# Patient Record
Sex: MALE | Race: WHITE
[De-identification: names, ages, dates, MRNs, and addresses within clinical notes are randomized per-mention and may not be internally consistent; named-entity substitution may affect disease eponyms.]

---

## 2018-03-10 ENCOUNTER — HOSPITAL ENCOUNTER (EMERGENCY)
Dept: HOSPITAL 47 - EC | Age: 48
LOS: 1 days | Discharge: HOME | End: 2018-03-11
Payer: COMMERCIAL

## 2018-03-10 VITALS — RESPIRATION RATE: 18 BRPM

## 2018-03-10 DIAGNOSIS — Z79.899: ICD-10-CM

## 2018-03-10 DIAGNOSIS — R73.9: ICD-10-CM

## 2018-03-10 DIAGNOSIS — E78.5: ICD-10-CM

## 2018-03-10 DIAGNOSIS — R53.83: Primary | ICD-10-CM

## 2018-03-10 LAB
ALBUMIN SERPL-MCNC: 4.4 G/DL (ref 3.5–5)
ALP SERPL-CCNC: 69 U/L (ref 38–126)
ALT SERPL-CCNC: 69 U/L (ref 21–72)
ANION GAP SERPL CALC-SCNC: 11 MMOL/L
AST SERPL-CCNC: 37 U/L (ref 17–59)
BASOPHILS # BLD AUTO: 0.1 K/UL (ref 0–0.2)
BASOPHILS NFR BLD AUTO: 1 %
BUN SERPL-SCNC: 13 MG/DL (ref 9–20)
CALCIUM SPEC-MCNC: 9.5 MG/DL (ref 8.4–10.2)
CHLORIDE SERPL-SCNC: 107 MMOL/L (ref 98–107)
CO2 SERPL-SCNC: 27 MMOL/L (ref 22–30)
EOSINOPHIL # BLD AUTO: 0.3 K/UL (ref 0–0.7)
EOSINOPHIL NFR BLD AUTO: 6 %
ERYTHROCYTE [DISTWIDTH] IN BLOOD BY AUTOMATED COUNT: 5.15 M/UL (ref 4.3–5.9)
ERYTHROCYTE [DISTWIDTH] IN BLOOD: 13.1 % (ref 11.5–15.5)
GLUCOSE BLD-MCNC: 105 MG/DL (ref 75–99)
GLUCOSE SERPL-MCNC: 106 MG/DL (ref 74–99)
HCT VFR BLD AUTO: 43.9 % (ref 39–53)
HGB BLD-MCNC: 14.8 GM/DL (ref 13–17.5)
LYMPHOCYTES # SPEC AUTO: 1.7 K/UL (ref 1–4.8)
LYMPHOCYTES NFR SPEC AUTO: 30 %
MCH RBC QN AUTO: 28.6 PG (ref 25–35)
MCHC RBC AUTO-ENTMCNC: 33.6 G/DL (ref 31–37)
MCV RBC AUTO: 85.1 FL (ref 80–100)
MONOCYTES # BLD AUTO: 0.4 K/UL (ref 0–1)
MONOCYTES NFR BLD AUTO: 7 %
NEUTROPHILS # BLD AUTO: 3.1 K/UL (ref 1.3–7.7)
NEUTROPHILS NFR BLD AUTO: 55 %
PH UR: 7 [PH] (ref 5–8)
PLATELET # BLD AUTO: 168 K/UL (ref 150–450)
POTASSIUM SERPL-SCNC: 3.8 MMOL/L (ref 3.5–5.1)
PROT SERPL-MCNC: 7.3 G/DL (ref 6.3–8.2)
SODIUM SERPL-SCNC: 145 MMOL/L (ref 137–145)
SP GR UR: 1.01 (ref 1–1.03)
UROBILINOGEN UR QL STRIP: <2 MG/DL (ref ?–2)
WBC # BLD AUTO: 5.7 K/UL (ref 3.8–10.6)

## 2018-03-10 PROCEDURE — 87086 URINE CULTURE/COLONY COUNT: CPT

## 2018-03-10 PROCEDURE — 80053 COMPREHEN METABOLIC PANEL: CPT

## 2018-03-10 PROCEDURE — 85025 COMPLETE CBC W/AUTO DIFF WBC: CPT

## 2018-03-10 PROCEDURE — 36415 COLL VENOUS BLD VENIPUNCTURE: CPT

## 2018-03-10 PROCEDURE — 93005 ELECTROCARDIOGRAM TRACING: CPT

## 2018-03-10 PROCEDURE — 81003 URINALYSIS AUTO W/O SCOPE: CPT

## 2018-03-10 PROCEDURE — 99283 EMERGENCY DEPT VISIT LOW MDM: CPT

## 2018-03-10 PROCEDURE — 96360 HYDRATION IV INFUSION INIT: CPT

## 2018-03-11 VITALS — HEART RATE: 59 BPM | DIASTOLIC BLOOD PRESSURE: 79 MMHG | TEMPERATURE: 97.8 F | SYSTOLIC BLOOD PRESSURE: 144 MMHG

## 2018-03-11 NOTE — ED
General Adult HPI





- General


Chief complaint: Recheck/Abnormal Lab/Rx


Stated complaint: sent by [facility]


Time Seen by Provider: 03/10/18 21:51


Source: patient, family, RN notes reviewed


Mode of arrival: ambulatory


Limitations: no limitations





- History of Present Illness


Initial comments: 





Chief complaint and history of present illness a 47-year-old male here with his 

wife.  The patient reports not feeling great lately.  She been seeing his 

family physician never discussing problems with possible early blood sugar 

problems.  He does report that his A1c was only 6.1 yesterday.  This morning he 

ate some jelly beans checked his sugar afterwards was 198.  Today he went to 

[facility] initial blood sugar measurement was 161.  He was advised to come 

the emergency room because didn't feel well.  Blood sugar here upon arrival was 

105.  The patient's nonspecific is complaints of not feeling well.  Denying 

headache chest pain shortness breath GI/ problems.





- Related Data


 Home Medications











 Medication  Instructions  Recorded  Confirmed


 


Atorvastatin [Lipitor] 20 mg PO HS 07/04/16 07/04/16











 Allergies











Allergy/AdvReac Type Severity Reaction Status Date / Time


 


No Known Allergies Allergy   Verified 07/04/16 21:53














Review of Systems


ROS Statement: 


Those systems with pertinent positive or pertinent negative responses have been 

documented in the HPI.


Review of systems no complaint of headache or visual acuity changes no chest 

pain or shortness of breath no GI/ problems.  No neuro deficits.  All systems 

were reviewed.  Past medical problems significant for hyperlipidemia.  The 

patient's surgeries include a left rotator cuff repair.  Family history heart 

disease diabetes but no cancers.  Patient denies ALLERGIES nonsmoker nondrinker.








ROS Other: All systems not noted in ROS Statement are negative.





Past Medical History


Past Medical History: Hyperlipidemia


History of Any Multi-Drug Resistant Organisms: None Reported


Past Surgical History: Orthopedic Surgery


Additional Past Surgical History / Comment(s): vasectomy


Past Psychological History: No Psychological Hx Reported


Smoking Status: Never smoker


Past Alcohol Use History: Rare


Past Drug Use History: None Reported





General Exam





- General Exam Comments


Initial Comments: 





General:


The patient is awake and alert, in no distress, has mild complaint of not 

feeling well without specifics.  Vital signs shows temperature 97.2 pulse 56 

respiratory rate 18 pulse ox on percent room air blood pressure 144/93


Eye:


Pupils are equal, round and reactive to light, extra-ocular movements are intact

; there is normal conjunctiva bilaterally. No signs of icterus. 


Ears, nose, mouth and throat:


There are moist mucous membranes and no oral lesions. 


Neck:


The neck is supple, there is no tenderness  


Cardiovascular:


There is a regular rate and rhythm. No murmur, rub or gallop is appreciated.


Respiratory:


Lungs are clear to auscultation, respirations are non-labored, breath sounds 

are equal. No wheezes, stridor, rales, or rhonchi.


Gastrointestinal:


Soft, non-distended, non-tender abdomen without masses or organomegaly noted. 

There is no rebound or guarding present. No CVA tenderness. Bowel sounds are 

unremarkable.


Back:


There is no tenderness to palpation in the midline. There is no obvious 

deformity. No rashes noted. 


Musculoskeletal:


Normal ROM, no tenderness, There is no pedal edema. There is no calf tenderness 

or swelling.  


Neurological:


No neuro deficits.


Skin:


Skin is warm and dry and no rashes or lesions are noted. 


Psychiatric:


Cooperative, c 





Limitations: no limitations





Course





 Vital Signs











  03/10/18 03/10/18





  20:38 22:10


 


Temperature 97.2 F L 


 


Pulse Rate 56 L 61


 


Respiratory 18 18





Rate  


 


Blood Pressure 144/93 128/80


 


O2 Sat by Pulse 100 97





Oximetry  














Medical Decision Making





- Medical Decision Making





Medical decision making; 47-year-old male here with his wife.  With a complaint 

just not feeling well.  At home his blood sugars are significantly elevated 

they were just taken after he had jellybeans.  In emergency room his labs are 

significantly improved with a blood sugar of only 105. 





Labs show white count of 5.7 hemoglobin 14.8 hematocrit of 43.9 with a 

potassium 3.8.  BUN 13 creatinine 0.7 GFR greater than 60 and a glucose of 106.

  Urinalysis clean no signs of infection or spilling sugar.  Patient advised to 

learn proper diet.  Increase fluid, exercises well.  Follow-up with family 

physician.











- Lab Data


Result diagrams: 


 03/10/18 23:04





 03/10/18 23:04





 Lab Results











  03/10/18 03/10/18 03/10/18 Range/Units





  21:55 22:40 23:04 


 


WBC    5.7  (3.8-10.6)  k/uL


 


RBC    5.15  (4.30-5.90)  m/uL


 


Hgb    14.8  (13.0-17.5)  gm/dL


 


Hct    43.9  (39.0-53.0)  %


 


MCV    85.1  (80.0-100.0)  fL


 


MCH    28.6  (25.0-35.0)  pg


 


MCHC    33.6  (31.0-37.0)  g/dL


 


RDW    13.1  (11.5-15.5)  %


 


Plt Count    168  (150-450)  k/uL


 


Neutrophils %    55  %


 


Lymphocytes %    30  %


 


Monocytes %    7  %


 


Eosinophils %    6  %


 


Basophils %    1  %


 


Neutrophils #    3.1  (1.3-7.7)  k/uL


 


Lymphocytes #    1.7  (1.0-4.8)  k/uL


 


Monocytes #    0.4  (0-1.0)  k/uL


 


Eosinophils #    0.3  (0-0.7)  k/uL


 


Basophils #    0.1  (0-0.2)  k/uL


 


Sodium     (137-145)  mmol/L


 


Potassium     (3.5-5.1)  mmol/L


 


Chloride     ()  mmol/L


 


Carbon Dioxide     (22-30)  mmol/L


 


Anion Gap     mmol/L


 


BUN     (9-20)  mg/dL


 


Creatinine     (0.66-1.25)  mg/dL


 


Est GFR (CKD-EPI)AfAm     (>60 ml/min/1.73 sqM)  


 


Est GFR (CKD-EPI)NonAf     (>60 ml/min/1.73 sqM)  


 


Glucose     (74-99)  mg/dL


 


POC Glucose (mg/dL)  105 H    (75-99)  mg/dL


 


POC Glu Operater ID  Rolando, Edel    


 


Calcium     (8.4-10.2)  mg/dL


 


Total Bilirubin     (0.2-1.3)  mg/dL


 


AST     (17-59)  U/L


 


ALT     (21-72)  U/L


 


Alkaline Phosphatase     ()  U/L


 


Total Protein     (6.3-8.2)  g/dL


 


Albumin     (3.5-5.0)  g/dL


 


Urine Color   Light Yellow   


 


Urine Appearance   Clear   (Clear)  


 


Urine pH   7.0   (5.0-8.0)  


 


Ur Specific Gravity   1.008   (1.001-1.035)  


 


Urine Protein   Negative   (Negative)  


 


Urine Glucose (UA)   Negative   (Negative)  


 


Urine Ketones   Negative   (Negative)  


 


Urine Blood   Negative   (Negative)  


 


Urine Nitrite   Negative   (Negative)  


 


Urine Bilirubin   Negative   (Negative)  


 


Urine Urobilinogen   <2.0   (<2.0)  mg/dL


 


Ur Leukocyte Esterase   Negative   (Negative)  














  03/10/18 Range/Units





  23:04 


 


WBC   (3.8-10.6)  k/uL


 


RBC   (4.30-5.90)  m/uL


 


Hgb   (13.0-17.5)  gm/dL


 


Hct   (39.0-53.0)  %


 


MCV   (80.0-100.0)  fL


 


MCH   (25.0-35.0)  pg


 


MCHC   (31.0-37.0)  g/dL


 


RDW   (11.5-15.5)  %


 


Plt Count   (150-450)  k/uL


 


Neutrophils %   %


 


Lymphocytes %   %


 


Monocytes %   %


 


Eosinophils %   %


 


Basophils %   %


 


Neutrophils #   (1.3-7.7)  k/uL


 


Lymphocytes #   (1.0-4.8)  k/uL


 


Monocytes #   (0-1.0)  k/uL


 


Eosinophils #   (0-0.7)  k/uL


 


Basophils #   (0-0.2)  k/uL


 


Sodium  145  (137-145)  mmol/L


 


Potassium  3.8  (3.5-5.1)  mmol/L


 


Chloride  107  ()  mmol/L


 


Carbon Dioxide  27  (22-30)  mmol/L


 


Anion Gap  11  mmol/L


 


BUN  13  (9-20)  mg/dL


 


Creatinine  0.70  (0.66-1.25)  mg/dL


 


Est GFR (CKD-EPI)AfAm  >90  (>60 ml/min/1.73 sqM)  


 


Est GFR (CKD-EPI)NonAf  >90  (>60 ml/min/1.73 sqM)  


 


Glucose  106 H  (74-99)  mg/dL


 


POC Glucose (mg/dL)   (75-99)  mg/dL


 


POC Glu Operater ID   


 


Calcium  9.5  (8.4-10.2)  mg/dL


 


Total Bilirubin  0.7  (0.2-1.3)  mg/dL


 


AST  37  (17-59)  U/L


 


ALT  69  (21-72)  U/L


 


Alkaline Phosphatase  69  ()  U/L


 


Total Protein  7.3  (6.3-8.2)  g/dL


 


Albumin  4.4  (3.5-5.0)  g/dL


 


Urine Color   


 


Urine Appearance   (Clear)  


 


Urine pH   (5.0-8.0)  


 


Ur Specific Gravity   (1.001-1.035)  


 


Urine Protein   (Negative)  


 


Urine Glucose (UA)   (Negative)  


 


Urine Ketones   (Negative)  


 


Urine Blood   (Negative)  


 


Urine Nitrite   (Negative)  


 


Urine Bilirubin   (Negative)  


 


Urine Urobilinogen   (<2.0)  mg/dL


 


Ur Leukocyte Esterase   (Negative)  














Disposition


Clinical Impression: 


 Fatigue





Disposition: HOME SELF-CARE


Condition: Fair


Instructions:  How to Check Your Blood Sugar (ED), Type 2 Diabetes in Adults (ED

)


Additional Instructions: 


Eat a balanced diet.  Exercise daily.  Follow-up with family physician


Referrals: 


Desmond Davenport MD [Primary Care Provider] - 1-2 days


Time of Disposition: 00:06